# Patient Record
Sex: FEMALE | Race: BLACK OR AFRICAN AMERICAN
[De-identification: names, ages, dates, MRNs, and addresses within clinical notes are randomized per-mention and may not be internally consistent; named-entity substitution may affect disease eponyms.]

---

## 2020-09-26 NOTE — RADIOLOGY REPORT (SQ)
EXAM DESCRIPTION:  WRIST LEFT 3 VIEWS



IMAGES COMPLETED DATE/TIME:  9/26/2020 4:31 pm



REASON FOR STUDY:  wrist fracture



COMPARISON:  None.



EXAM PARAMETERS:  NUMBER OF VIEWS: Three views.

TECHNIQUE: AP, lateral and oblique  radiographic images acquired of the left wrist.

LIMITATIONS: None.



FINDINGS:  MINERALIZATION: Normal.

BONES: Comminuted fracture of the distal radius with 1.5 cm impaction and lateral displacement.  Radi
ocarpal articulation appears intact on the oblique -lateral projection.  Scapholunate joint appears w
idened at 4 mm suggesting ligament tear.  9 mm ulnar styloid avulsion fracture is 8 mm laterally disp
laced.

 No radiopaque foreign body.

OTHER: No other significant finding.



IMPRESSION:  Comminuted fracture of the distal radius with 1.5 cm impaction and lateral displacement.
  Radiocarpal articulation appears intact on the oblique projection.  Scapholunate joint appears wide
kelly at 4 mm suggesting ligament tear. 9 mm ulnar styloid avulsion fracture is 8 mm laterally displace
d.



TECHNICAL DOCUMENTATION:  JOB ID:  [id number]

[id number]

 2011 [email or web address]- All Rights Reserved



Reading location - IP/workstation name: [email or web address]
[id number] Detailed

## 2020-09-26 NOTE — ER DOCUMENT REPORT
ED Medical Screen (RME)





- General


Chief Complaint: Hand Injury


Stated Complaint: LEFT HAND/WRIST INJURY


Time Seen by Provider: 09/26/20 15:56


Notes: 





HPI: 65-year-old female presenting after fall down some stairs last night.  

Injured the left wrist.  Believes she may have hit her head but did not lose 

consciousness.  Is not on blood thinners.  Denies other injuries or complaints. 

Patient is right-hand dominant.





PHYSICAL EXAMINATION: Deformity and bruising is noted to the left wrist.  No 

tenderness over the radial head on palpation of the left elbow.  No visible head

injury.  Answering all questions appropriately.  Last ate or drank 30 to 45 

minutes ago











I have greeted and performed a rapid initial assessment of this patient.  A 

comprehensive ED assessment and evaluation of the patient, analysis of test 

results and completion of medical decision making process will be conducted by 

an additional ED providers.


TRAVEL OUTSIDE OF THE [place] IN LAST 30 DAYS: No





- Related Data


Allergies/Adverse Reactions: 


                                        





No Known Allergies Allergy (Verified 09/26/20 15:55)


   











Past Medical History


Past Surgical History: Reports: Hx Breast Surgery - reduction





- Immunizations


Hx Diphtheria, Pertussis, Tetanus Vaccination: Yes

## 2020-09-26 NOTE — ER DOCUMENT REPORT
ED Hand/Wrist Injury





- General


Chief Complaint: Wrist Pain


Stated Complaint: LEFT HAND/WRIST INJURY


Time Seen by Provider: 20 15:56


Notes: 





This 65-year-old woman presents to the emergency department with a history of a 

fall down steps last night injuring her left forearm and wrist.  She has an 

obvious deformity with swelling noted.  She denies any other associated 

injuries.


TRAVEL OUTSIDE OF THE [place] IN LAST 30 DAYS: No





- Related Data


Allergies/Adverse Reactions: 


                                        





No Known Allergies Allergy (Verified 20 15:55)


   











Past Medical History





- Social History


Smoking Status: Never Smoker


Family History: Reviewed & Not Pertinent


Past Surgical History: Reports: Hx Breast Surgery - reduction





- Immunizations


Hx Diphtheria, Pertussis, Tetanus Vaccination: Yes





Review of Systems





- Review of Systems


Notes: 





Constitutional: Negative for fever.


HENT: Negative for sore throat.


Eyes: Negative for visual changes.


Cardiovascular: Negative for chest pain.


Respiratory: Negative for shortness of breath.


Gastrointestinal: Negative for abdominal pain, vomiting or diarrhea.


Genitourinary: Negative for dysuria.


Musculoskeletal: See HPI


Skin: Negative for rash.


Neurological: Negative for headaches, weakness or numbness.





10 point ROS negative except as marked above and in HPI.





Physical Exam





- Vital signs


Vitals: 


                                        











Temp Pulse Resp BP Pulse Ox


 


 99.4 F   110 H  18   132/76 H  97 


 


 20 15:59  20 15:59  20 15:59  20 15:59  20 15:59














- Notes


Notes: 





PHYSICAL EXAMINATION:


 


Physical Exam:


General: Well-nourished well-developed 65-year-old female in no acute distress


HEENT: NC/AT, pupils equal round and reactive to light, MM moist,nares clear, 

oropharynx clear, airway patent


Neck: supple, no adenopathy, no masses.  Good range of motion


Lungs: clear, no wheezing, no rales no rhonchi


CVS: Regular rate and rhythm no murmur gallop or rub


Abdomen: Soft, active, nontender, no masses, no hepatosplenomegaly


Ext:   No edema, clubbing or cyanosis.


Neuro: Alert and responsive, moving all 4 extremities on command, cranial nerves

intact, no focal findings


Skin: Intact no open lesions, no rash


PSYCH: Normal mood, normal affect.


 








Course





- Re-evaluation


Re-evalutation: 





20 19:27


I discussed the patient with Dr. Curran, orthopedics, he has suggested that the 

patient be put in a fingertrap 5 pound weight and reduce the fracture put in a 

volar splint sling and follow-up in his office next week.  The patient is told 

that she has a fracture that likely will require surgery she is to keep splint 

in place and sling until she sees the orthopedist next week.  She is to call the

office on Monday morning to schedule the appointment.  Patient acknowledges 

understanding of this plan and is being discharged from the emergency department


20 19:37


Patient was placed in a fingertrap, improved alignment noted, a volar splint was

applied and the arm was placed in a sling.





- Vital Signs


Vital signs: 


                                        











Temp Pulse Resp BP Pulse Ox


 


 99.4 F   110 H  18   132/76 H  97 


 


 20 15:59  20 15:59  20 15:59  20 15:59  20 15:59














- Diagnostic Test


Radiology reviewed: Image reviewed, Reports reviewed


Radiology results interpreted by me: 





20 19:28


X-ray left wrist reveal a comminuted fracture of the distal radius with a 

displacement of the distal fragment.








Procedures





- Immobilization


  ** Left Distal Wrist


Time completed: 19:40


Pre-Proc Neuro Vasc Exam: Normal


Immobilizer type: Short Leg Posterior


Performed by: Provider assisted, Other - Tech and RN


Post-Proc Neuro Vasc Exam: Normal


Alignment checked and good: Yes





Discharge





- Discharge


Clinical Impression: 


Closed left radial fracture


Qualifiers:


 Encounter type: initial encounter Radius location: distal Fracture morphology: 

unspecified fracture morphology Qualified Code(s): S52.502A - Unspecified 

fracture of the lower end of left radius, initial encounter for closed fracture





Condition: Good


Disposition: HOME, SELF-CARE


Instructions:  Fractured Radius and Ulna ([facility])


Additional Instructions: 


You were seen in the emergency department tonight with a fracture to your distal

ulna left hand.  Please keep the splint in place, please wear the sling for 

comfort.  You may use ibuprofen for pain or use the pain tablets that you are 

being sent home with.  On Monday contact the orthopedist, Dr. Curran and you 

will be scheduled to be seen for further treatment.








HOME CARE INSTRUCTIONS & INFORMATION:  Thank you for choosing us for your 

medical needs. We hope you're satisfied with the care you received.  After you 

leave, you must properly care for your problem and, at the same time, observe 

its progress.  Any condition can change.  Some illnesses can change rapidly over

hours or days.  If your condition worsens, return to the Emergency Department or

see your physician promptly.





ABOUT YOUR X-RAYS AND EKG'S:   If you had an EKG or X-rays taken, they have been

read by the Emergency Physician. The X-rays and EKG's will also be read by a 

Radiologist or Cardiologist within 24 hours.  If discrepancies are noted, you 

will be notified by telephone.  Please be certain the ED has a correct telephone

number & address where you can be reached.  Also, realize that some fractures or

abnormalities do not show up on initial X-rays.  If your symptoms continue, see 

your physician.





ABOUT YOUR LABORATORY TEST:   If you had laboratory tests, the results have been

reviewed by the Emergency Physician.  Some test results (for example cultures) 

may not be available for several days.  You will be contacted if any test result

shows you need additional treatment.  Please be certain the ED has a correct 

telephone number and address where you can be reached.





ABOUT YOUR MEDICATIONS:  You will receive instructions on how to take your 

medicine on the prescription label you receive.  Additional information may be 

provided by the Pharmacy.  If you have questions afterwards, call the ED for 

clarification or further instructions.  Some prescribed medications may cause 

drowsiness.  Do not perform tasks such as driving a car or operating machinery 

without consulting your Pharmacist.  If you feel you need a refill of pain 

medication, your condition will need re-evaluation.  Please do not call for a 

refill of any medication.





ABOUT YOUR SIGNATURE:   Signature of this document acknowledges to followin. Understanding that you received emergency treatment and that you may be 

released before al medical problems are known or treated. Please be certain   

the ED has a correct phone number & address where you can be reached.


   2. Acknowledgement that you will arrange for follow-up care as recommended.


   3. Authorization for the Emergency Physician to provide information to your 

follow-up Physician in order to maximize your care.





AT ANY TIME, IF YOUR SYMPTOMS CHANGE SIGNIFICANTLY OR WORSEN OR YOU DEVELOP NEW 

SYMPTOMS, RETURN TO THE EMERGENCY DEPARTMENT IMMEDIATELY FOR RE-EVALUATION.





OUR GOAL IS TO PROVIDE EXCELLENT MEDICAL CARE!





WE HOPE THAT WE HAVE MET YOUR EXPECTATIONS DURING YOUR EMERGENCY DEPARTMENT 

VISIT AND THAT YOU FEEL YOU HAVE RECEIVED EXCELLENT CARE!











Referrals: 


DIMITRIS CURRAN MD [ACTIVE STAFF] - Follow up as needed

## 2020-10-09 ENCOUNTER — HOSPITAL ENCOUNTER (OUTPATIENT)
Dept: HOSPITAL 62 - OROUT | Age: 65
Discharge: HOME | End: 2020-10-09
Attending: ORTHOPAEDIC SURGERY
Payer: MEDICARE

## 2020-10-09 VITALS — DIASTOLIC BLOOD PRESSURE: 94 MMHG | SYSTOLIC BLOOD PRESSURE: 142 MMHG

## 2020-10-09 DIAGNOSIS — S63.391A: ICD-10-CM

## 2020-10-09 DIAGNOSIS — S52.572A: Primary | ICD-10-CM
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
## 2020-10-09 DIAGNOSIS — Z03.818: ICD-10-CM

## 2020-10-09 PROCEDURE — C1713 ANCHOR/SCREW BN/BN,TIS/BN: HCPCS

## 2020-10-09 PROCEDURE — 01830 ANES ARTHR/NDSC WRST/HND NOS: CPT

## 2020-10-09 PROCEDURE — 25609 OPTX DST RD XART FX/EP SEP3+: CPT

## 2020-10-09 PROCEDURE — 73110 X-RAY EXAM OF WRIST: CPT

## 2020-10-09 PROCEDURE — 25320 REPAIR/REVISE WRIST JOINT: CPT

## 2020-10-09 PROCEDURE — 76942 ECHO GUIDE FOR BIOPSY: CPT

## 2020-10-09 PROCEDURE — 64772 INCISION OF SPINAL NERVE: CPT

## 2020-10-09 PROCEDURE — 64415 NJX AA&/STRD BRCH PLXS IMG: CPT

## 2020-10-09 RX ADMIN — NALOXONE HYDROCHLORIDE ONE MG: 0.4 INJECTION, SOLUTION INTRAMUSCULAR; INTRAVENOUS; SUBCUTANEOUS at 16:19

## 2020-10-09 RX ADMIN — NALOXONE HYDROCHLORIDE ONE MG: 0.4 INJECTION, SOLUTION INTRAMUSCULAR; INTRAVENOUS; SUBCUTANEOUS at 16:24

## 2020-10-09 NOTE — OPERATIVE REPORT
Operative Report


DATE OF SURGERY: 10/09/20


PREOPERATIVE DIAGNOSIS: 1.  Left four-part, intra-articular distal radius fract

ure.  2.  Left traumatic scapholunate ligament disruption


POSTOPERATIVE DIAGNOSIS: 1.  Left four-part, intra-articular distal radius 

fracture.  2.  Left traumatic scapholunate ligament disruption


OPERATION: 1.  Open reduction internal fixation left 4 part intra-articular 

distal radius fracture.  2.  Brachioradialis tendon lengthening.  3.  Scapholu

tawny ligament repair with free tendon graft.  4.  PIN neurectomy


SURGEON: DIMITRIS CHAPA


ANESTHESIA: GA


COMPLICATIONS: 





None


ESTIMATED BLOOD LOSS: Minimal


PROCEDURE: 





Indications for procedure: The patient is a 65-year-old woman who sustained an 

extremely comminuted and displaced fracture of the left distal radius.  

Radiographs also demonstrated a complete disruption of the scapholunate 

ligament.





Description of procedure: Following the induction of a general anesthetic and 

administration of 2 g of Ancef, the patient was positioned supine on the 

operating room table.  All bony prominences were padded.  The left upper 

extremity sterilely prepped with ChloraPrep and draped in standard fashion.  An 

extended FCR volar approach was performed.  Sharp incision was performed through

skin with blunt dissection through the subcutaneous tissues.  The radial artery 

was identified and protected.  The brachioadialis tendon was Z lengthened for 

later repair.  The first dorsal extensor compartment of the wrist was opened.  

At this point a comminuted fracture of the distal radius was identified.  The 

proximal radius was pronated out of the way of the fracture.  Fracture hematoma 

was excised.  Intra-articular fracture fragments were anatomically aligned using

the carpal bones as a template.  The radius was then supinated back into its 

position and the distal fragments aligned onto the forearm.  This was held into 

position with several clamps and K wires.  A Lykens volar locking plate was 

then applied through its gliding hole.  Several unicortical locking screws were 

placed distally.  Image intensification was brought in which demonstrated 

anatomic alignment of the fracture and correct placement of implants.  The 

remaining distal locking screws were placed unit cortically.  2 additional 

bicortical locking screws were placed in the shaft.  The wound was then 

copiously irrigated.  A 2 mm portion of the FCR tendon was harvested as a tendon

graft for later scapholunate reconstruction.  The pronator was repaired back to 

the radius.  The brachioradialis was repaired in its lengthened fashion.  The 

subcutaneous tissue was closed with 2-0 Vicryl and skin reapproximated with 3-0 

nylon.


We next turned our attention to the scapholunate ligament repair.


A dorsal incision was made.  Sharp incision through skin with blunt dissection 

taking care to protect tacked the superficial radial and ulnar nerves.  Third 

dorsal extensor compartment was opened and the EPL was retracted radially fourth

dorsal extensor compartment was opened and common extensor was retracted 

ulnarly.  At this point for pain relief a PIN dorsal neurectomy was performed.  

Distally based curvilinear capsulotomy was then performed.  Scapholunate 

ligament was identified and was found to be unstable.  0.62 Joselyn wires were

placed in the scaphoid and in the lunate and the diastases was reduced.  

Fixation was accomplished with 3 Arthrex swivel lock DS suture anchors.  These 

anchors were loaded with the FCR tendon graft and a suture tape.  This construct

was first docked into the scaphoid, then docked into the lunate, and then docked

into the distal pole of the scaphoid to prevent scaphoid hyperflexion.  The 

wound was then copiously irrigated the capsulotomy was imbricated back into 

position using the sutures remaining from scapholunate ligament reconstruction. 

The extensor retinaculum was then repaired side to side with 2-0 Vicryl.  The 

subcutaneous tissue was repaired with 2-0 Vicryl.  The skin was reapproximated 

with 3-0 nylon suture.  Sterile dressing was then applied.  The patient 

tolerated procedure well without complication was brought recovery in stable 

condition.

## 2020-10-09 NOTE — RADIOLOGY REPORT (SQ)
EXAM DESCRIPTION:  WRIST LEFT 3 VIEWS; NO CHG FLUORO



IMAGES COMPLETED DATE/TIME:  10/9/2020 2:22 pm



REASON FOR STUDY:  ORIF LEFT WRIST ASSISTED WITH FLUORO IN OR S52.572A  OTH INTARTIC FRACTURE OF LOWE
R END OF LEFT RADIUS,  S63.391A  TRAUMATIC RUPTURE OF OTH LIGAMENT OF RIGHT WRIST, I



COMPARISON:  9/26/2020 left wrist three views



FLUOROSCOPY TIME:  11 seconds

4 digital fluoroscopic images saved to PACS.



TECHNIQUE:  Intra-operative images acquired during surgical procedure to evaluate progress.

NUMBER OF IMAGES: 4 digital fluoroscopic images



LIMITATIONS:  None.



FINDINGS:  Intra procedural imaging and fluoro during ORIF comminuted distal left radius fracture.  G
ood alignment.  Please see the operative report for further details



IMPRESSION:  IMAGE(S) OBTAINED DURING PROCEDURE.



COMMENT:  Quality :  Final reports for procedures using fluoroscopy that document radiation exp
osure indices, or exposure time and number of fluorographic images (if radiation exposure indices are
 not available)

Please consult full operative report of the attending physician for description of the procedure.



TECHNICAL DOCUMENTATION:  JOB ID:  [id number]

 2011 [employer]- All Rights Reserved



Reading location - IP/workstation name: [email or web address]

## 2020-10-09 NOTE — RADIOLOGY REPORT (SQ)
EXAM DESCRIPTION:  WRIST LEFT 3 VIEWS; NO CHG FLUORO



IMAGES COMPLETED DATE/TIME:  10/9/2020 2:22 pm



REASON FOR STUDY:  ORIF LEFT WRIST ASSISTED WITH FLUORO IN OR S52.572A  OTH INTARTIC FRACTURE OF LOWE
R END OF LEFT RADIUS,  S63.391A  TRAUMATIC RUPTURE OF OTH LIGAMENT OF RIGHT WRIST, I



COMPARISON:  9/26/2020 left wrist three views



FLUOROSCOPY TIME:  11 seconds

4 digital fluoroscopic images saved to PACS.



TECHNIQUE:  Intra-operative images acquired during surgical procedure to evaluate progress.

NUMBER OF IMAGES: 4 digital fluoroscopic images



LIMITATIONS:  None.



FINDINGS:  Intra procedural imaging and fluoro during ORIF comminuted distal left radius fracture.  G
ood alignment.  Please see the operative report for further details



IMPRESSION:  IMAGE(S) OBTAINED DURING PROCEDURE.



COMMENT:  Quality :  Final reports for procedures using fluoroscopy that document radiation exp
osure indices, or exposure time and number of fluorographic images (if radiation exposure indices are
 not available)

Please consult full operative report of the attending physician for description of the procedure.



TECHNICAL DOCUMENTATION:  JOB ID:  [id number]

 2011 [employer]- All Rights Reserved



Reading location - IP/workstation name: [email or web address]